# Patient Record
Sex: MALE | Race: WHITE
[De-identification: names, ages, dates, MRNs, and addresses within clinical notes are randomized per-mention and may not be internally consistent; named-entity substitution may affect disease eponyms.]

---

## 2023-09-12 ENCOUNTER — HOSPITAL ENCOUNTER (OUTPATIENT)
Dept: HOSPITAL 95 - ORSCSDS | Age: 63
Discharge: HOME | End: 2023-09-12
Attending: OPHTHALMOLOGY
Payer: OTHER GOVERNMENT

## 2023-09-12 VITALS — WEIGHT: 203.27 LBS | HEIGHT: 68 IN | BODY MASS INDEX: 30.81 KG/M2

## 2023-09-12 VITALS — DIASTOLIC BLOOD PRESSURE: 94 MMHG | SYSTOLIC BLOOD PRESSURE: 135 MMHG

## 2023-09-12 DIAGNOSIS — H25.11: Primary | ICD-10-CM

## 2023-09-12 PROCEDURE — V2632 POST CHMBR INTRAOCULAR LENS: HCPCS

## 2023-09-12 PROCEDURE — 08RJ3JZ REPLACEMENT OF RIGHT LENS WITH SYNTHETIC SUBSTITUTE, PERCUTANEOUS APPROACH: ICD-10-PCS | Performed by: OPHTHALMOLOGY

## 2023-09-12 NOTE — NUR
09/12/23 9350 Lamar Dickerson
IV REMOVED WITH NO ISSUES. PT TOLERATED WELL. WIFE PRESENT FOR
DISCHARGE INSTRUCTIONS.

## 2023-09-12 NOTE — NUR
09/12/23 1425 Avila Carson
CALL LIGHT WITHIN REACH. TETRACAINE IN RIGHT EYE AT 1417 AND PLEDGETT
IN AT 1418